# Patient Record
Sex: MALE | URBAN - METROPOLITAN AREA
[De-identification: names, ages, dates, MRNs, and addresses within clinical notes are randomized per-mention and may not be internally consistent; named-entity substitution may affect disease eponyms.]

---

## 2023-02-06 ENCOUNTER — TELEPHONE ENCOUNTER (OUTPATIENT)
Dept: URBAN - METROPOLITAN AREA CLINIC 92 | Facility: CLINIC | Age: 18
End: 2023-02-06

## 2023-02-07 ENCOUNTER — OFFICE VISIT (OUTPATIENT)
Dept: URBAN - METROPOLITAN AREA CLINIC 96 | Facility: CLINIC | Age: 18
End: 2023-02-07

## 2023-02-24 ENCOUNTER — OFFICE VISIT (OUTPATIENT)
Dept: URBAN - METROPOLITAN AREA TELEHEALTH 2 | Facility: TELEHEALTH | Age: 18
End: 2023-02-24

## 2025-01-07 ENCOUNTER — P2P PATIENT RECORD (OUTPATIENT)
Age: 20
End: 2025-01-07

## 2025-01-13 ENCOUNTER — OFFICE VISIT (OUTPATIENT)
Dept: URBAN - METROPOLITAN AREA CLINIC 2 | Facility: CLINIC | Age: 20
End: 2025-01-13
Payer: SELF-PAY

## 2025-01-13 ENCOUNTER — DASHBOARD ENCOUNTERS (OUTPATIENT)
Age: 20
End: 2025-01-13

## 2025-01-13 VITALS
TEMPERATURE: 98 F | HEART RATE: 77 BPM | BODY MASS INDEX: 27.63 KG/M2 | WEIGHT: 176.4 LBS | SYSTOLIC BLOOD PRESSURE: 118 MMHG | DIASTOLIC BLOOD PRESSURE: 72 MMHG

## 2025-01-13 DIAGNOSIS — K58.1 IRRITABLE BOWEL SYNDROME WITH CONSTIPATION: ICD-10-CM

## 2025-01-13 PROBLEM — 440630006: Status: ACTIVE | Noted: 2025-01-13

## 2025-01-13 PROCEDURE — 99203 OFFICE O/P NEW LOW 30 MIN: CPT | Performed by: NURSE PRACTITIONER

## 2025-01-13 NOTE — HPI-TODAY'S VISIT:
19-year-old male seen today after an ER visit for rectal pain and hemorrhoids.  Patient was seen at [facility] ER on January 7.  Per ER records exam noted external hemorrhoid.  Patient was given hemorrhoid cream.  He was recommended to use stool softeners and to follow-up with GI. He used hemorrhoid cream and hemorrhoid resolved. colace helped but he stopped after 3 days. he denies rectal pain. Still has to strain with BM. if he drink alcohol he may have loose stool. He notes that his diet has not been very healthy recently. He has also been to ER earlier in Dec for abd pain. he had normal CT. His weight is stable.

## 2025-02-17 ENCOUNTER — OFFICE VISIT (OUTPATIENT)
Dept: URBAN - METROPOLITAN AREA CLINIC 2 | Facility: CLINIC | Age: 20
End: 2025-02-17